# Patient Record
Sex: FEMALE | Race: WHITE | NOT HISPANIC OR LATINO | Employment: OTHER | URBAN - METROPOLITAN AREA
[De-identification: names, ages, dates, MRNs, and addresses within clinical notes are randomized per-mention and may not be internally consistent; named-entity substitution may affect disease eponyms.]

---

## 2017-04-05 ENCOUNTER — TRANSCRIBE ORDERS (OUTPATIENT)
Dept: ADMINISTRATIVE | Facility: HOSPITAL | Age: 77
End: 2017-04-05

## 2017-04-05 DIAGNOSIS — Z12.31 VISIT FOR SCREENING MAMMOGRAM: Primary | ICD-10-CM

## 2017-12-06 ENCOUNTER — APPOINTMENT (OUTPATIENT)
Dept: MAMMOGRAPHY | Facility: HOSPITAL | Age: 77
End: 2017-12-06
Attending: FAMILY MEDICINE

## 2021-06-14 DIAGNOSIS — M25.562 LEFT KNEE PAIN, UNSPECIFIED CHRONICITY: Primary | ICD-10-CM

## 2021-06-17 ENCOUNTER — OFFICE VISIT (OUTPATIENT)
Dept: ORTHOPEDIC SURGERY | Facility: CLINIC | Age: 81
End: 2021-06-17

## 2021-06-17 ENCOUNTER — HOSPITAL ENCOUNTER (OUTPATIENT)
Dept: GENERAL RADIOLOGY | Facility: HOSPITAL | Age: 81
Discharge: HOME | End: 2021-06-17
Admitting: FAMILY MEDICINE

## 2021-06-17 VITALS
HEART RATE: 75 BPM | WEIGHT: 164 LBS | BODY MASS INDEX: 32.2 KG/M2 | HEIGHT: 60 IN | DIASTOLIC BLOOD PRESSURE: 79 MMHG | SYSTOLIC BLOOD PRESSURE: 162 MMHG

## 2021-06-17 DIAGNOSIS — M25.662 KNEE STIFF, LEFT: ICD-10-CM

## 2021-06-17 DIAGNOSIS — M25.562 LEFT KNEE PAIN, UNSPECIFIED CHRONICITY: ICD-10-CM

## 2021-06-17 DIAGNOSIS — G89.29 CHRONIC PAIN OF LEFT KNEE: Primary | ICD-10-CM

## 2021-06-17 DIAGNOSIS — M17.12 PRIMARY OSTEOARTHRITIS OF LEFT KNEE: ICD-10-CM

## 2021-06-17 DIAGNOSIS — M25.562 CHRONIC PAIN OF LEFT KNEE: Primary | ICD-10-CM

## 2021-06-17 DIAGNOSIS — M25.462 EFFUSION OF LEFT KNEE: ICD-10-CM

## 2021-06-17 PROCEDURE — 99204 OFFICE O/P NEW MOD 45 MIN: CPT | Performed by: FAMILY MEDICINE

## 2021-06-17 PROCEDURE — 20610 DRAIN/INJ JOINT/BURSA W/O US: CPT | Performed by: FAMILY MEDICINE

## 2021-06-17 PROCEDURE — 73562 X-RAY EXAM OF KNEE 3: CPT

## 2021-06-17 PROCEDURE — 73562 X-RAY EXAM OF KNEE 3: CPT | Performed by: RADIOLOGY

## 2021-06-17 RX ORDER — IBUPROFEN 600 MG/1
TABLET ORAL
COMMUNITY
Start: 2021-04-19 | End: 2021-06-17

## 2021-06-17 RX ORDER — BUSPIRONE HYDROCHLORIDE 7.5 MG/1
TABLET ORAL
COMMUNITY
Start: 2021-05-06

## 2021-06-17 RX ORDER — TRAMADOL HYDROCHLORIDE 50 MG/1
TABLET ORAL
COMMUNITY
Start: 2021-04-19

## 2021-06-17 RX ORDER — TIZANIDINE 2 MG/1
TABLET ORAL
COMMUNITY
Start: 2021-04-19

## 2021-06-17 RX ORDER — METOPROLOL TARTRATE 100 MG/1
TABLET ORAL
COMMUNITY
Start: 2021-04-19

## 2021-06-17 RX ORDER — NAPROXEN 500 MG/1
500 TABLET ORAL 2 TIMES DAILY
Qty: 60 TABLET | Refills: 0 | Status: SHIPPED | OUTPATIENT
Start: 2021-06-17 | End: 2021-07-12

## 2021-06-17 RX ADMIN — METHYLPREDNISOLONE ACETATE 80 MG: 80 INJECTION, SUSPENSION INTRA-ARTICULAR; INTRALESIONAL; INTRAMUSCULAR; SOFT TISSUE at 12:24

## 2021-06-17 RX ADMIN — BUPIVACAINE HYDROCHLORIDE 5 ML: 5 INJECTION, SOLUTION EPIDURAL; INTRACAUDAL at 12:24

## 2021-06-17 RX ADMIN — BUPIVACAINE HYDROCHLORIDE 1 ML: 5 INJECTION, SOLUTION EPIDURAL; INTRACAUDAL at 12:24

## 2021-06-17 NOTE — PROGRESS NOTES
New Patient Visit      Patient: Jing Shrestha  YOB: 1940  Date of Encounter: 06/17/2021  PCP: Al France MD  Referring Provider: Jason Chan MD     Subjective   Jing Shrestha is a 80 y.o. female who presents to the office today for evaluation of Pain, Edema, and Initial Evaluation of the Left Knee      Chief Complaint:   Chief Complaint   Patient presents with   • Left Knee - Pain, Edema, Initial Evaluation       HPI:   HPI  Patient presents with complaint of severe left medial knee pain.  Very tender sometimes hurts her just to have the pressure of her clothes.  Bothers her a lot at night.  Had a steroid injection in the past which did not give her a whole lot of relief.  Interested in further treatments especially viscosupplementation.  Accompanied by her daughters Casandra and Alicia.  States she had the right knee replaced in 2001  Active Problem List:  Patient Active Problem List   Diagnosis   • Chronic pain of left knee       Past Medical History:  Past Medical History:   Diagnosis Date   • Anemia    • Arthritis    • Back complaints    • Bronchitis    • Bunion    • H/O bladder infections    • Headache    • High blood pressure    • History of blood transfusion        Past Surgical History:  Past Surgical History:   Procedure Laterality Date   • BREAST CYST EXCISION     • KNEE SURGERY     • OOPHORECTOMY         Family History:  Family History   Problem Relation Age of Onset   • Breast cancer Sister 65   • Cancer Sister    • Arthritis Mother    • Asthma Mother    • Heart disease Father        Social History:  Social History     Socioeconomic History   • Marital status: Single     Spouse name: Not on file   • Number of children: Not on file   • Years of education: Not on file   • Highest education level: Not on file   Tobacco Use   • Smoking status: Never Smoker   • Smokeless tobacco: Never Used   Vaping Use   • Vaping Use: Never used   Substance and Sexual Activity   • Alcohol use: Never  "      Medications:  Current Outpatient Medications   Medication Sig Dispense Refill   • busPIRone (BUSPAR) 7.5 MG tablet      • metoprolol tartrate (LOPRESSOR) 100 MG tablet      • tiZANidine (ZANAFLEX) 2 MG tablet      • traMADol (ULTRAM) 50 MG tablet      • Diclofenac Sodium (VOLTAREN) 1 % gel gel Apply 4 g topically to the appropriate area as directed 4 (Four) Times a Day As Needed (knee pain). Apply to medial left knee 150 g 1   • naproxen (NAPROSYN) 500 MG tablet Take 1 tablet by mouth 2 (Two) Times a Day. 60 tablet 0     No current facility-administered medications for this visit.       Allergies:  No Known Allergies    Review of Systems:   Review of Systems   Constitutional: Positive for activity change. Negative for fever.   Respiratory: Negative for shortness of breath.    Cardiovascular: Negative for chest pain.   Musculoskeletal: Positive for arthralgias, gait problem, joint swelling and myalgias.   Neurological: Negative for weakness and numbness.       Physical Exam:   Physical Exam  Vitals and nursing note reviewed.   Constitutional:       General: She is not in acute distress.     Appearance: Normal appearance.   Pulmonary:      Effort: Pulmonary effort is normal. No respiratory distress.   Musculoskeletal:      Left knee: Effusion and crepitus present. Decreased range of motion. Tenderness present over the medial joint line. No LCL laxity or MCL laxity.Normal pulse.      Comments: Painful on testing of the MCL.   Skin:     General: Skin is warm and dry.      Findings: No erythema.   Neurological:      General: No focal deficit present.      Mental Status: She is alert.      Sensory: No sensory deficit.      Motor: No weakness.       GENERAL: 80 y.o. female, alert and oriented X 3 in no acute distress.   Visit Vitals  /79   Pulse 75   Ht 152.4 cm (60\")   Wt 74.4 kg (164 lb)   BMI 32.03 kg/m²       Radiology Results:    XR Knee 3 View Left    Result Date: 6/17/2021  Moderate osteoarthritis in the " left knee.           Large Joint Arthrocentesis: L knee  Date/Time: 6/17/2021 12:24 PM  Consent given by: patient  Site marked: site marked  Timeout: Immediately prior to procedure a time out was called to verify the correct patient, procedure, equipment, support staff and site/side marked as required   Supporting Documentation  Indications: pain and joint swelling   Procedure Details  Location: knee - L knee  Preparation: Patient was prepped in usual sterile fashion.  Needle size: 22 G  Approach: anterolateral (infrapatellar)  Medications administered: 80 mg methylPREDNISolone acetate 80 MG/ML; 5 mL bupivacaine (PF) 0.5 %; 1 mL bupivacaine (PF) 0.5 %  Patient tolerance: patient tolerated the procedure well with no immediate complications      Injection site on lateral knee was cleaned with alcohol and iodine and anesthesia provided with ethyl chloride.  Then using 22-gauge 1.5 inch needle the knee joint was accessed and a mixture of 6 cc of 0.5% bupivicaine and 80 mg of methylprednisolone were injected.  Area was covered with sterile bandage.  Follow-up care precautions were discussed.  There were no adverse effects and negligible blood loss.        Assessment & Plan:   Assessment/Plan   Diagnoses and all orders for this visit:    1. Chronic pain of left knee (Primary)  -     naproxen (NAPROSYN) 500 MG tablet; Take 1 tablet by mouth 2 (Two) Times a Day.  Dispense: 60 tablet; Refill: 0  -     Diclofenac Sodium (VOLTAREN) 1 % gel gel; Apply 4 g topically to the appropriate area as directed 4 (Four) Times a Day As Needed (knee pain). Apply to medial left knee  Dispense: 150 g; Refill: 1  -     Ambulatory Referral to Physical Therapy Evaluate and treat  -     CBC & Differential  -     Comprehensive Metabolic Panel  -     Uric Acid  -     Sedimentation Rate  -     C-reactive Protein    2. Primary osteoarthritis of left knee  -     naproxen (NAPROSYN) 500 MG tablet; Take 1 tablet by mouth 2 (Two) Times a Day.  Dispense:  60 tablet; Refill: 0  -     Diclofenac Sodium (VOLTAREN) 1 % gel gel; Apply 4 g topically to the appropriate area as directed 4 (Four) Times a Day As Needed (knee pain). Apply to medial left knee  Dispense: 150 g; Refill: 1  -     Ambulatory Referral to Physical Therapy Evaluate and treat  -     CBC & Differential  -     Comprehensive Metabolic Panel  -     Uric Acid  -     Sedimentation Rate  -     C-reactive Protein    3. Effusion of left knee  -     naproxen (NAPROSYN) 500 MG tablet; Take 1 tablet by mouth 2 (Two) Times a Day.  Dispense: 60 tablet; Refill: 0  -     Diclofenac Sodium (VOLTAREN) 1 % gel gel; Apply 4 g topically to the appropriate area as directed 4 (Four) Times a Day As Needed (knee pain). Apply to medial left knee  Dispense: 150 g; Refill: 1  -     Ambulatory Referral to Physical Therapy Evaluate and treat  -     CBC & Differential  -     Comprehensive Metabolic Panel  -     Uric Acid  -     Sedimentation Rate  -     C-reactive Protein    4. Knee stiff, left  -     naproxen (NAPROSYN) 500 MG tablet; Take 1 tablet by mouth 2 (Two) Times a Day.  Dispense: 60 tablet; Refill: 0  -     Diclofenac Sodium (VOLTAREN) 1 % gel gel; Apply 4 g topically to the appropriate area as directed 4 (Four) Times a Day As Needed (knee pain). Apply to medial left knee  Dispense: 150 g; Refill: 1  -     Ambulatory Referral to Physical Therapy Evaluate and treat  -     CBC & Differential  -     Comprehensive Metabolic Panel  -     Uric Acid  -     Sedimentation Rate  -     C-reactive Protein    Other orders  -     Large Joint Arthrocentesis        MEDICATION ISSUES:  Discussed medication options and treatment plan of prescribed medication as well as the risks, benefits, and side effects including potential falls, possible impaired driving and metabolic adversities among others. Patient is agreeable to call the office with any worsening of symptoms or onset of side effects. Patient is agreeable to call 911 or go to the  nearest ER should he/she begin having SI/HI.     MEDS ORDERED DURING VISIT:  New Medications Ordered This Visit   Medications   • naproxen (NAPROSYN) 500 MG tablet     Sig: Take 1 tablet by mouth 2 (Two) Times a Day.     Dispense:  60 tablet     Refill:  0   • Diclofenac Sodium (VOLTAREN) 1 % gel gel     Sig: Apply 4 g topically to the appropriate area as directed 4 (Four) Times a Day As Needed (knee pain). Apply to medial left knee     Dispense:  150 g     Refill:  1   f/u in 2 weeks. Plan to do a viscosupplementation injection if we can get it approved. discussed that patient will end up needing knee replacement surgery eventually. May want to think about doing it sooner due to her age. She should go ahead and start that discussion with her PCP and cardiologist.             This document has been electronically signed by Trey Talley DO   June 17, 2021 12:29 EDT    Part of this note may be an electronic transcription/translation of spoken language to printed text using the Dragon Dictation System.

## 2021-06-22 RX ORDER — BUPIVACAINE HYDROCHLORIDE 5 MG/ML
1 INJECTION, SOLUTION EPIDURAL; INTRACAUDAL
Status: COMPLETED | OUTPATIENT
Start: 2021-06-17 | End: 2021-06-17

## 2021-06-22 RX ORDER — METHYLPREDNISOLONE ACETATE 80 MG/ML
80 INJECTION, SUSPENSION INTRA-ARTICULAR; INTRALESIONAL; INTRAMUSCULAR; SOFT TISSUE
Status: COMPLETED | OUTPATIENT
Start: 2021-06-17 | End: 2021-06-17

## 2021-06-22 RX ORDER — BUPIVACAINE HYDROCHLORIDE 5 MG/ML
5 INJECTION, SOLUTION EPIDURAL; INTRACAUDAL
Status: COMPLETED | OUTPATIENT
Start: 2021-06-17 | End: 2021-06-17

## 2021-07-09 DIAGNOSIS — M17.12 PRIMARY OSTEOARTHRITIS OF LEFT KNEE: ICD-10-CM

## 2021-07-09 DIAGNOSIS — M25.462 EFFUSION OF LEFT KNEE: ICD-10-CM

## 2021-07-09 DIAGNOSIS — M25.662 KNEE STIFF, LEFT: ICD-10-CM

## 2021-07-09 DIAGNOSIS — M25.562 CHRONIC PAIN OF LEFT KNEE: ICD-10-CM

## 2021-07-09 DIAGNOSIS — G89.29 CHRONIC PAIN OF LEFT KNEE: ICD-10-CM

## 2021-07-12 RX ORDER — NAPROXEN 500 MG/1
500 TABLET ORAL EVERY 12 HOURS PRN
Qty: 60 TABLET | Refills: 1 | Status: SHIPPED | OUTPATIENT
Start: 2021-07-12 | End: 2021-07-29 | Stop reason: SINTOL

## 2021-07-26 ENCOUNTER — TELEPHONE (OUTPATIENT)
Dept: ORTHOPEDIC SURGERY | Facility: CLINIC | Age: 81
End: 2021-07-26

## 2021-07-26 NOTE — TELEPHONE ENCOUNTER
Caller: OTILIA SMILEY    Relationship to patient: SELF    Best call back number: [phone]    Type of visit: F/U LEFT KNEE    Additional notes: PATIENT WOULD LIKE A CALL BACK TO DISCUSS GETTING GEL INJECTION ON 7-29-21 APPT.  SEEN LAST ON 6-17-21

## 2021-07-29 ENCOUNTER — OFFICE VISIT (OUTPATIENT)
Dept: ORTHOPEDIC SURGERY | Facility: CLINIC | Age: 81
End: 2021-07-29

## 2021-07-29 ENCOUNTER — HOSPITAL ENCOUNTER (OUTPATIENT)
Dept: GENERAL RADIOLOGY | Facility: HOSPITAL | Age: 81
Discharge: HOME | End: 2021-07-29
Admitting: FAMILY MEDICINE

## 2021-07-29 VITALS
HEIGHT: 60 IN | TEMPERATURE: 98.6 F | HEART RATE: 62 BPM | BODY MASS INDEX: 32.2 KG/M2 | WEIGHT: 164 LBS | DIASTOLIC BLOOD PRESSURE: 75 MMHG | SYSTOLIC BLOOD PRESSURE: 174 MMHG

## 2021-07-29 DIAGNOSIS — M25.662 KNEE STIFF, LEFT: ICD-10-CM

## 2021-07-29 DIAGNOSIS — M17.12 PRIMARY OSTEOARTHRITIS OF LEFT KNEE: ICD-10-CM

## 2021-07-29 DIAGNOSIS — G89.29 CHRONIC BILATERAL LOW BACK PAIN WITH BILATERAL SCIATICA: Primary | ICD-10-CM

## 2021-07-29 DIAGNOSIS — M54.41 CHRONIC BILATERAL LOW BACK PAIN WITH BILATERAL SCIATICA: Primary | ICD-10-CM

## 2021-07-29 DIAGNOSIS — M51.36 DDD (DEGENERATIVE DISC DISEASE), LUMBAR: ICD-10-CM

## 2021-07-29 DIAGNOSIS — M25.562 CHRONIC PAIN OF LEFT KNEE: ICD-10-CM

## 2021-07-29 DIAGNOSIS — G89.29 CHRONIC PAIN OF LEFT KNEE: ICD-10-CM

## 2021-07-29 DIAGNOSIS — M54.42 CHRONIC BILATERAL LOW BACK PAIN WITH BILATERAL SCIATICA: Primary | ICD-10-CM

## 2021-07-29 PROCEDURE — 72170 X-RAY EXAM OF PELVIS: CPT | Performed by: RADIOLOGY

## 2021-07-29 PROCEDURE — 20610 DRAIN/INJ JOINT/BURSA W/O US: CPT | Performed by: FAMILY MEDICINE

## 2021-07-29 PROCEDURE — 72110 X-RAY EXAM L-2 SPINE 4/>VWS: CPT

## 2021-07-29 PROCEDURE — 72170 X-RAY EXAM OF PELVIS: CPT

## 2021-07-29 PROCEDURE — 72110 X-RAY EXAM L-2 SPINE 4/>VWS: CPT | Performed by: RADIOLOGY

## 2021-07-29 PROCEDURE — 99214 OFFICE O/P EST MOD 30 MIN: CPT | Performed by: FAMILY MEDICINE

## 2021-07-29 RX ORDER — LIDOCAINE HYDROCHLORIDE 10 MG/ML
5 INJECTION, SOLUTION EPIDURAL; INFILTRATION; INTRACAUDAL; PERINEURAL
Status: COMPLETED | OUTPATIENT
Start: 2021-07-29 | End: 2021-07-29

## 2021-07-29 RX ADMIN — LIDOCAINE HYDROCHLORIDE 5 ML: 10 INJECTION, SOLUTION EPIDURAL; INFILTRATION; INTRACAUDAL; PERINEURAL at 16:11

## 2021-07-29 NOTE — PROGRESS NOTES
"Follow Up Visit      Patient: Jing Shrestha  YOB: 1940  Date of Encounter: 07/29/2021  PCP: Al France MD    Subjective   Jing Shrestha is a 80 y.o. female who presents to the office today as a follow up for Follow-up and Pain of the Left Knee      Chief Complaint:   Chief Complaint   Patient presents with   • Left Knee - Follow-up, Pain       HPI:   HPI  Patient presents for follow-up for chronic left knee pain.  Had a steroid injection to last visit.  Reports that she only got about 1 week of relief from it and it is now completely worn off.  Knee pain is back to where it was.  Would like to try the Monovisc injection we discussed previously.  Patient also complains of chronic low back pain issues which have been bothering her for years.  Patient has been told she has degenerative changes and \"collapsed disks\".  I do not have her old MRI but patient was getting spine injections as recently as 4 years ago through a doctor in [place].  Patient would like to restart these injections preferably more locally.  Low back pain has been getting steadily worse and sometimes radiates down the legs.  Patient states that she wakes up at 3 in the morning every day with back pain and has to go soak in the tub in hot water to relieve it.    Patient reports that she could not take the naproxen because it gave her heartburn but the Voltaren gel is helping.  She stopped the naproxen and switched back to the ibuprofen but it is not really working.  Patient has a muscle relaxer but does not take it consistently.  Allergies:  No Known Allergies    Review of Systems:   Review of Systems   Constitutional: Positive for activity change. Negative for fever.   Respiratory: Negative for shortness of breath.    Cardiovascular: Negative for chest pain.   Musculoskeletal: Positive for arthralgias, back pain, gait problem, joint swelling, myalgias and neck pain.   Neurological: Negative for weakness and numbness. " "      Visit Vitals  /75   Pulse 62   Temp 98.6 °F (37 °C)   Ht 152.4 cm (60\")   Wt 74.4 kg (164 lb)   BMI 32.03 kg/m²       Physical Exam:   Physical Exam  Vitals and nursing note reviewed.   Constitutional:       General: She is not in acute distress.     Appearance: Normal appearance.   Pulmonary:      Effort: Pulmonary effort is normal. No respiratory distress.   Musculoskeletal:      Lumbar back: Spasms, tenderness and bony tenderness present. Decreased range of motion. Positive left straight leg raise test.      Left knee: Crepitus present. No effusion. Decreased range of motion. Tenderness present over the medial joint line. No LCL laxity or MCL laxity.Normal pulse.      Comments: Borderline positive left seated straight leg raise test.  Tender over lower lumbar spine paraspinals and SI joints     Skin:     General: Skin is warm and dry.      Findings: No erythema.   Neurological:      General: No focal deficit present.      Mental Status: She is alert.      Sensory: No sensory deficit.      Motor: No weakness.         Radiology Results:    No radiology results for the last 30 days.  X-ray lumbar and pelvis: Preliminary interpretation: Diffuse mild to moderate degenerative changes in lumbar spine and SI joints.  No acute process    Large Joint Arthrocentesis: L knee  Date/Time: 7/29/2021 4:11 PM  Consent given by: patient  Site marked: site marked  Timeout: Immediately prior to procedure a time out was called to verify the correct patient, procedure, equipment, support staff and site/side marked as required   Supporting Documentation  Indications: pain and joint swelling   Procedure Details  Location: knee - L knee  Preparation: Patient was prepped in usual sterile fashion.  Needle size: 20 G  Approach: anterolateral (infrapatellar)  Medications administered: 5 mL lidocaine PF 1% 1 %; 88 mg Hyaluronan 88 MG/4ML  Patient tolerance: patient tolerated the procedure well with no immediate " complications        Injection site on the lateral knee was cleaned with alcohol and iodine and anesthesia was provided with ethyl chloride spray.  Then 3 cc of 1% lidocaine without epinephrine was injected into the subcutaneous tissues using a 1.5 inch 25-gauge needle for anesthesia.  Then using sterile technique and a 20-gauge 1.5 inch needle the knee joint was accessed and further 2 cc of 1% lidocaine was injected to confirm access to the joint.  Using sterile technique the syringe was switched and 88 mg of Monovisc was injected.  The injection site was covered with sterile bandage.  There is no adverse effects and negligible  blood loss.  Follow-up care and precautions were discussed.    Assessment & Plan:   Assessment/Plan   Diagnoses and all orders for this visit:    1. 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  Dispense: 90 tablet; Refill: 1  -     MRI Lumbar Spine Without Contrast; Future    2. Chronic pain of left knee  -     XR Spine Lumbar 4+ View  -     XR Pelvis 1 or 2 View  -     diclofenac (VOLTAREN) 50 MG EC tablet; Take 1 tablet by mouth Every 8 (Eight) Hours As Needed (pain).  Dispense: 90 tablet; Refill: 1    3. Primary osteoarthritis of left knee  -     XR Spine Lumbar 4+ View  -     XR Pelvis 1 or 2 View  -     diclofenac (VOLTAREN) 50 MG EC tablet; Take 1 tablet by mouth Every 8 (Eight) Hours As Needed (pain).  Dispense: 90 tablet; Refill: 1    4. Knee stiff, left  -     XR Spine Lumbar 4+ View  -     XR Pelvis 1 or 2 View  -     diclofenac (VOLTAREN) 50 MG EC tablet; Take 1 tablet by mouth Every 8 (Eight) Hours As Needed (pain).  Dispense: 90 tablet; Refill: 1    5. DDD (degenerative disc disease), lumbar  -     MRI Lumbar Spine Without Contrast; Future    Other orders  -     Large Joint Arthrocentesis:  L knee        Visit Diagnoses:    ICD-10-CM ICD-9-CM   1. Chronic bilateral low back pain with bilateral sciatica  M54.42 724.2    M54.41 724.3    G89.29 338.29   2. Chronic pain of left knee  M25.562 719.46    G89.29 338.29   3. Primary osteoarthritis of left knee  M17.12 715.16   4. Knee stiff, left  M25.662 719.56   5. DDD (degenerative disc disease), lumbar  M51.36 722.52       MEDICATION ISSUES:  Discussed medication options and treatment plan of prescribed medication as well as the risks, benefits, and side effects including potential falls, possible impaired driving and metabolic adversities among others. Patient is agreeable to call the office with any worsening of symptoms or onset of side effects. Patient is agreeable to call 911 or go to the nearest ER should he/she begin having SI/HI.     MEDS ORDERED DURING VISIT:  New Medications Ordered This Visit   Medications   • diclofenac (VOLTAREN) 50 MG EC tablet     Sig: Take 1 tablet by mouth Every 8 (Eight) Hours As Needed (pain).     Dispense:  90 tablet     Refill:  1     Change NSAID to enteric-coated diclofenac.  Recommend patient try taking tizanidine nightly instead of just as needed.  Gave patient home exercises for low back and SI.  She does not want to do real PT right now as transportation is a problem in her insurance will only pay for limited trips from [place].  Patient did not get a satisfactory response to intra-articular steroid injection in the left knee.  If this is repeated in 3 months would likely use Zilretta instead of short acting.  Monovisc injection today in the office.  Patient did get some initial relief from the local anesthetic.  Patient will follow up in a month for recheck and to go over MRI results and to decide if she wants to pursue further spine injections.  Patient will call us back in about a week to let us know if she had any problems with the injection.         This document has been electronically signed by Trey  DO Gerri   July 29, 2021 16:48 EDT    Part of this note may be an electronic transcription/translation of spoken language to printed text using the Dragon Dictation System.

## 2021-09-03 ENCOUNTER — TELEPHONE (OUTPATIENT)
Dept: ORTHOPEDIC SURGERY | Facility: CLINIC | Age: 81
End: 2021-09-03

## 2021-09-03 NOTE — TELEPHONE ENCOUNTER
Spoke with patient intending to cancel her appt because she doesn't have her MRI completed but she wants a steroid inj. I am waiting to hear back from Dr. Talley to see if she can get one. She also informed me that she needs open MRI.

## 2021-09-08 ENCOUNTER — TELEPHONE (OUTPATIENT)
Dept: ORTHOPEDIC SURGERY | Facility: CLINIC | Age: 81
End: 2021-09-08

## 2021-09-08 NOTE — TELEPHONE ENCOUNTER
Patient called, I explained the situation with the MRI. She wants to speak with Dr. Talley about it so I am leaving her on the schedule for tomorrow. She said she is trying to line up a ride with a taxi service through her insurance to bring her. Her daughter brought her last time but she lives in [place]. She is going to call me back to let me know if she will have a ride.

## 2021-09-09 ENCOUNTER — OFFICE VISIT (OUTPATIENT)
Dept: ORTHOPEDIC SURGERY | Facility: CLINIC | Age: 81
End: 2021-09-09

## 2021-09-09 VITALS
HEART RATE: 65 BPM | DIASTOLIC BLOOD PRESSURE: 95 MMHG | WEIGHT: 164 LBS | HEIGHT: 60 IN | BODY MASS INDEX: 32.2 KG/M2 | SYSTOLIC BLOOD PRESSURE: 156 MMHG | TEMPERATURE: 97.3 F

## 2021-09-09 DIAGNOSIS — M17.12 PRIMARY OSTEOARTHRITIS OF LEFT KNEE: ICD-10-CM

## 2021-09-09 DIAGNOSIS — G89.29 CHRONIC PAIN OF LEFT KNEE: Primary | ICD-10-CM

## 2021-09-09 DIAGNOSIS — M51.36 DDD (DEGENERATIVE DISC DISEASE), LUMBAR: ICD-10-CM

## 2021-09-09 DIAGNOSIS — G89.29 CHRONIC BILATERAL LOW BACK PAIN WITH BILATERAL SCIATICA: ICD-10-CM

## 2021-09-09 DIAGNOSIS — M25.662 KNEE STIFF, LEFT: ICD-10-CM

## 2021-09-09 DIAGNOSIS — M54.41 CHRONIC BILATERAL LOW BACK PAIN WITH BILATERAL SCIATICA: ICD-10-CM

## 2021-09-09 DIAGNOSIS — M25.562 CHRONIC PAIN OF LEFT KNEE: Primary | ICD-10-CM

## 2021-09-09 DIAGNOSIS — M25.462 EFFUSION OF LEFT KNEE: ICD-10-CM

## 2021-09-09 DIAGNOSIS — M54.42 CHRONIC BILATERAL LOW BACK PAIN WITH BILATERAL SCIATICA: ICD-10-CM

## 2021-09-09 PROCEDURE — 99214 OFFICE O/P EST MOD 30 MIN: CPT | Performed by: FAMILY MEDICINE

## 2021-09-09 PROCEDURE — 96372 THER/PROPH/DIAG INJ SC/IM: CPT | Performed by: FAMILY MEDICINE

## 2021-09-09 RX ORDER — KETOROLAC TROMETHAMINE 30 MG/ML
60 INJECTION, SOLUTION INTRAMUSCULAR; INTRAVENOUS ONCE
Status: COMPLETED | OUTPATIENT
Start: 2021-09-09 | End: 2021-09-09

## 2021-09-09 RX ORDER — HYDROCODONE BITARTRATE AND ACETAMINOPHEN 7.5; 325 MG/1; MG/1
TABLET ORAL SEE ADMIN INSTRUCTIONS
COMMUNITY
Start: 2021-08-31

## 2021-09-09 RX ORDER — MELOXICAM 7.5 MG/1
7.5 TABLET ORAL DAILY
Qty: 30 TABLET | Refills: 1 | Status: SHIPPED | OUTPATIENT
Start: 2021-09-09 | End: 2021-09-30 | Stop reason: SDUPTHER

## 2021-09-09 RX ADMIN — KETOROLAC TROMETHAMINE 60 MG: 30 INJECTION, SOLUTION INTRAMUSCULAR; INTRAVENOUS at 09:38

## 2021-09-09 NOTE — PROGRESS NOTES
"Follow Up Visit      Patient: Jing Shrestha  YOB: 1940  Date of Encounter: 09/09/2021  PCP: Al France MD    Subjective   Jing Shrestha is a 80 y.o. female who presents to the office today as a follow up for Follow-up and Pain of the Left Knee      Chief Complaint:   Chief Complaint   Patient presents with   • Left Knee - Follow-up, Pain       HPI:   HPI  Patient presents for follow-up for left knee pain.  Reports only mild improvement after Monovisc injection at the last visit.  Continues to be fairly sore on the medial knee.  Interested in further treatment.  Does not feel her NSAID is helping much and would like to change it.  Allergies:  No Known Allergies    Review of Systems:   Review of Systems   Constitutional: Positive for activity change. Negative for fever.   Respiratory: Negative for shortness of breath.    Cardiovascular: Negative for chest pain.   Musculoskeletal: Positive for arthralgias, back pain, gait problem, joint swelling, myalgias and neck pain.   Neurological: Negative for weakness and numbness.       Visit Vitals  /95   Pulse 65   Temp 97.3 °F (36.3 °C)   Ht 152.4 cm (60\")   Wt 74.4 kg (164 lb)   BMI 32.03 kg/m²       Physical Exam:   Physical Exam  Vitals and nursing note reviewed.   Constitutional:       General: She is not in acute distress.     Appearance: Normal appearance.   Pulmonary:      Effort: Pulmonary effort is normal. No respiratory distress.   Musculoskeletal:      Lumbar back: Spasms, tenderness and bony tenderness present. Decreased range of motion. Positive left straight leg raise test.      Left knee: Crepitus present. No effusion. Decreased range of motion. Tenderness present over the medial joint line. No LCL laxity or MCL laxity.Normal pulse.      Comments: Improved pain intensity from previous in the left knee   Skin:     General: Skin is warm and dry.      Findings: No erythema.   Neurological:      General: No focal deficit present.      " Mental Status: She is alert.      Sensory: No sensory deficit.      Motor: No weakness.     Assessment & Plan:   Assessment/Plan   Diagnoses and all orders for this visit:    1. Chronic pain of left knee (Primary)  -     meloxicam (MOBIC) 7.5 MG tablet; Take 1 tablet by mouth Daily.  Dispense: 30 tablet; Refill: 1  -      Custome Knee Orthosis  -     ketorolac (TORADOL) injection 60 mg    2. Primary osteoarthritis of left knee  -     meloxicam (MOBIC) 7.5 MG tablet; Take 1 tablet by mouth Daily.  Dispense: 30 tablet; Refill: 1  -      Custome Knee Orthosis  -     ketorolac (TORADOL) injection 60 mg    3. Knee stiff, left  -      Custome Knee Orthosis  -     ketorolac (TORADOL) injection 60 mg    4. Effusion of left knee  -      Custome Knee Orthosis  -     ketorolac (TORADOL) injection 60 mg    5. Chronic bilateral low back pain with bilateral sciatica  -     meloxicam (MOBIC) 7.5 MG tablet; Take 1 tablet by mouth Daily.  Dispense: 30 tablet; Refill: 1  -     ketorolac (TORADOL) injection 60 mg    6. DDD (degenerative disc disease), lumbar  -     ketorolac (TORADOL) injection 60 mg        Visit Diagnoses:    ICD-10-CM ICD-9-CM   1. Chronic pain of left knee  M25.562 719.46    G89.29 338.29   2. Primary osteoarthritis of left knee  M17.12 715.16   3. Knee stiff, left  M25.662 719.56   4. Effusion of left knee  M25.462 719.06   5. Chronic bilateral low back pain with bilateral sciatica  M54.42 724.2    M54.41 724.3    G89.29 338.29   6. DDD (degenerative disc disease), lumbar  M51.36 722.52       MEDICATION ISSUES:  Discussed medication options and treatment plan of prescribed medication as well as the risks, benefits, and side effects including potential falls, possible impaired driving and metabolic adversities among others. Patient is agreeable to call the office with any worsening of symptoms or onset of side effects. Patient is agreeable to call 911 or go to the nearest ER should he/she begin  having SI/HI.     MEDS ORDERED DURING VISIT:  New Medications Ordered This Visit   Medications   • meloxicam (MOBIC) 7.5 MG tablet     Sig: Take 1 tablet by mouth Daily.     Dispense:  30 tablet     Refill:  1   • ketorolac (TORADOL) injection 60 mg     Patient desires further injections.  We will get her precertified for Zilretta and she will follow-up for the injection.  We have fitted her with a deloader brace today in the office which she reports significant relief while wearing.  Stopped patient's diclofenac and will try Mobic instead.  Patient will let me know if she has any GI side effects.  Toradol given for pain relief in the office.  We will delay starting her Mobic for 12 hours..       This document has been electronically signed by Trey Talley DO   September 9, 2021 17:40 EDT    Part of this note may be an electronic transcription/translation of spoken language to printed text using the Dragon Dictation System.

## 2021-09-20 DIAGNOSIS — M25.562 CHRONIC PAIN OF LEFT KNEE: ICD-10-CM

## 2021-09-20 DIAGNOSIS — M25.662 KNEE STIFF, LEFT: ICD-10-CM

## 2021-09-20 DIAGNOSIS — G89.29 CHRONIC BILATERAL LOW BACK PAIN WITH BILATERAL SCIATICA: ICD-10-CM

## 2021-09-20 DIAGNOSIS — M17.12 PRIMARY OSTEOARTHRITIS OF LEFT KNEE: ICD-10-CM

## 2021-09-20 DIAGNOSIS — M54.42 CHRONIC BILATERAL LOW BACK PAIN WITH BILATERAL SCIATICA: ICD-10-CM

## 2021-09-20 DIAGNOSIS — G89.29 CHRONIC PAIN OF LEFT KNEE: ICD-10-CM

## 2021-09-20 DIAGNOSIS — M54.41 CHRONIC BILATERAL LOW BACK PAIN WITH BILATERAL SCIATICA: ICD-10-CM

## 2021-09-21 ENCOUNTER — TELEPHONE (OUTPATIENT)
Dept: ORTHOPEDIC SURGERY | Facility: CLINIC | Age: 81
End: 2021-09-21

## 2021-09-21 NOTE — TELEPHONE ENCOUNTER
Called patient to give her an appt to come in for [place]ong. She is scheduled for this coming Thursday at 8:45. She is contacting her daughter for a ride if there is any problem she will call and we will re-schedule. Patient stated that her knee has been killing her and it's hot on the right side. She hasn't been running a fever.

## 2021-09-23 ENCOUNTER — TELEPHONE (OUTPATIENT)
Dept: ORTHOPEDIC SURGERY | Facility: CLINIC | Age: 81
End: 2021-09-23

## 2021-09-23 NOTE — TELEPHONE ENCOUNTER
Patient called someone jerman Canales called from our number. Not sure what the call was about and who called her. I asked about her knee and she said it's killing her, can barely walk. She stated she rescheduled her appt from to today to the 30th. Stated she will probably have to have her knee rebuilt but didn't know if insurance would cover that. She is scheduled to get the Zilretta inj at upcoming appt with Dr. Talley.

## 2021-09-30 ENCOUNTER — OFFICE VISIT (OUTPATIENT)
Dept: ORTHOPEDIC SURGERY | Facility: CLINIC | Age: 81
End: 2021-09-30

## 2021-09-30 VITALS
HEART RATE: 65 BPM | DIASTOLIC BLOOD PRESSURE: 82 MMHG | SYSTOLIC BLOOD PRESSURE: 198 MMHG | BODY MASS INDEX: 32.2 KG/M2 | WEIGHT: 164 LBS | HEIGHT: 60 IN

## 2021-09-30 DIAGNOSIS — M25.562 CHRONIC PAIN OF LEFT KNEE: ICD-10-CM

## 2021-09-30 DIAGNOSIS — G89.29 CHRONIC PAIN OF LEFT KNEE: ICD-10-CM

## 2021-09-30 DIAGNOSIS — G89.29 CHRONIC BILATERAL LOW BACK PAIN WITH BILATERAL SCIATICA: ICD-10-CM

## 2021-09-30 DIAGNOSIS — M17.12 PRIMARY OSTEOARTHRITIS OF LEFT KNEE: ICD-10-CM

## 2021-09-30 DIAGNOSIS — M54.41 CHRONIC BILATERAL LOW BACK PAIN WITH BILATERAL SCIATICA: ICD-10-CM

## 2021-09-30 DIAGNOSIS — M54.42 CHRONIC BILATERAL LOW BACK PAIN WITH BILATERAL SCIATICA: ICD-10-CM

## 2021-09-30 PROCEDURE — 20610 DRAIN/INJ JOINT/BURSA W/O US: CPT | Performed by: FAMILY MEDICINE

## 2021-09-30 PROCEDURE — 99214 OFFICE O/P EST MOD 30 MIN: CPT | Performed by: FAMILY MEDICINE

## 2021-09-30 RX ORDER — MELOXICAM 15 MG/1
15 TABLET ORAL DAILY
Qty: 30 TABLET | Refills: 1 | Status: SHIPPED | OUTPATIENT
Start: 2021-09-30

## 2021-09-30 RX ORDER — BUPIVACAINE HYDROCHLORIDE 5 MG/ML
5 INJECTION, SOLUTION EPIDURAL; INTRACAUDAL
Status: COMPLETED | OUTPATIENT
Start: 2021-09-30 | End: 2021-09-30

## 2021-09-30 RX ADMIN — BUPIVACAINE HYDROCHLORIDE 5 ML: 5 INJECTION, SOLUTION EPIDURAL; INTRACAUDAL at 11:35

## 2021-09-30 NOTE — PROGRESS NOTES
"Follow Up Visit      Patient: Jing Shrestha  YOB: 1940  Date of Encounter: 09/30/2021  PCP: Al France MD  Referring Provider: No ref. provider found     Subjective   Jing Shrestha is a 80 y.o. female who presents to the office today for evaluation of Pain and Follow-up of the Left Knee      Chief Complaint   Patient presents with   • Left Knee - Pain, Follow-up       HPI  Patient presents for follow-up for left knee pain.  Reports that she did not think the Mobic was working quite as well as her Aleve and she switched back to it.  Was only on the 7.5 mg dose of Mobic.  Reports that her medial left knee is very painful with significant burning sensation that is nearly constant.  Patient has been wearing her  knee brace and feels that it is helping her get around better.  Does want to get a Zilretta injection today if possible.  They have still not scheduled her lumbar spine MRI due to transportation and insurance coverage issues.  Patient Active Problem List   Diagnosis   • Chronic pain of left knee       Past Medical History:   Diagnosis Date   • Anemia    • Arthritis    • Back complaints    • Bronchitis    • Bunion    • H/O bladder infections    • Headache    • High blood pressure    • History of blood transfusion        No Known Allergies    Review of Systems   Constitutional: Positive for activity change. Negative for fever.   Respiratory: Negative for shortness of breath.    Cardiovascular: Negative for chest pain.   Musculoskeletal: Positive for arthralgias, back pain, gait problem, joint swelling, myalgias and neck pain.   Neurological: Negative for weakness and numbness.       Visit Vitals  BP (!) 198/82   Pulse 65   Ht 152.4 cm (60\")   Wt 74.4 kg (164 lb)   BMI 32.03 kg/m²     80 y.o.female  Physical Exam  Vitals and nursing note reviewed.   Constitutional:       General: She is not in acute distress.     Appearance: Normal appearance.   Pulmonary:      Effort: Pulmonary effort " is normal. No respiratory distress.   Musculoskeletal:      Lumbar back: Spasms, tenderness and bony tenderness present. Decreased range of motion. Positive left straight leg raise test.      Left knee: Crepitus present. No effusion. Decreased range of motion. Tenderness present over the medial joint line. No LCL laxity or MCL laxity.Normal pulse.      Instability Tests: Medial Deepali test positive.      Comments: Knee tenderness much worse than previous   Skin:     General: Skin is warm and dry.      Findings: No erythema.   Neurological:      General: No focal deficit present.      Mental Status: She is alert.      Sensory: No sensory deficit.      Motor: No weakness.         Radiology Results:    Reviewed imaging: X-ray left knee 6/17/2021  The bones of the knee show fairly advanced changes of osteoarthritis  with complete medial joint space loss, sclerosis and bony remodeling.  There are also arthritic changes in the patellofemoral joint space.     IMPRESSION:  Moderate osteoarthritis in the left knee.     Large Joint Arthrocentesis: L knee  Date/Time: 9/30/2021 11:35 AM  Consent given by: patient  Site marked: site marked  Timeout: Immediately prior to procedure a time out was called to verify the correct patient, procedure, equipment, support staff and site/side marked as required   Supporting Documentation  Indications: pain and joint swelling   Procedure Details  Location: knee - L knee  Preparation: Patient was prepped in usual sterile fashion.  Needle size: 20 G  Approach: anterolateral (infrapatellar)  Medications administered: 32 mg Triamcinolone Acetonide 32 MG; 5 mL bupivacaine (PF) 0.5 %  Patient tolerance: patient tolerated the procedure well with no immediate complications       Injection site on the lateral knee was cleaned with alcohol and iodine and anesthesia was provided with ethyl chloride spray.  Then 5 cc of 0.5% bupivacaine without epinephrine was injected into the subcutaneous tissues  using a 1.5 inch 25-gauge needle for anesthesia.  Then using sterile technique and a 20-gauge 1.5 inch needle the knee joint was accessed 32mg of zilretta was injected.  The injection site was covered with sterile bandage.  There is no adverse effects and negligible  blood loss.  Follow-up care and precautions were discussed.      Assessment/Plan   Diagnoses and all orders for this visit:    1. Chronic pain of left knee  -     meloxicam (MOBIC) 15 MG tablet; Take 1 tablet by mouth Daily.  Dispense: 30 tablet; Refill: 1    2. Primary osteoarthritis of left knee  -     meloxicam (MOBIC) 15 MG tablet; Take 1 tablet by mouth Daily.  Dispense: 30 tablet; Refill: 1    3. Chronic bilateral low back pain with bilateral sciatica  -     meloxicam (MOBIC) 15 MG tablet; Take 1 tablet by mouth Daily.  Dispense: 30 tablet; Refill: 1         MEDS ORDERED DURING VISIT:  New Medications Ordered This Visit   Medications   • meloxicam (MOBIC) 15 MG tablet     Sig: Take 1 tablet by mouth Daily.     Dispense:  30 tablet     Refill:  1     MEDICATION ISSUES:  Discussed medication options and treatment plan of prescribed medication as well as the risks, benefits, and side effects including potential falls, possible impaired driving and metabolic adversities among others. Patient is agreeable to call the office with any worsening of symptoms or onset of side effects. Patient is agreeable to call 911 or go to the nearest ER should he/she begin having SI/HI.     Discussion:  Zilretta injection the left knee today in the office.  Patient has previously had short acting steroid and Monovisc injections without any significant lasting relief.  Hopefully this time release medicine will give her better relief.  Patient should switch back to Mobic at the higher dose of 15 mg.  Try it for a few weeks and let me know if it is not working.  We will continue trying to schedule her MRI.  Follow-up in 3 to 4 weeks           This document has been  electronically signed by Trey Talley DO   September 30, 2021 11:32 EDT    Part of this note may be an electronic transcription/translation of spoken language to printed text using the Dragon Dictation System.

## 2021-11-18 ENCOUNTER — TELEPHONE (OUTPATIENT)
Dept: ORTHOPEDIC SURGERY | Facility: CLINIC | Age: 81
End: 2021-11-18

## 2021-11-18 NOTE — TELEPHONE ENCOUNTER
Caller: OTILIA Lopez call back number: [phone]    What is the medical concern/diagnosis: BACK PAIN    What specialty or service is being requested: MRI FOR BACK    What is the provider, practice or medical service name: [facility]      What is the office phone number: [phone]

## 2021-11-19 ENCOUNTER — TELEPHONE (OUTPATIENT)
Dept: ORTHOPEDIC SURGERY | Facility: CLINIC | Age: 81
End: 2021-11-19

## 2021-11-19 NOTE — TELEPHONE ENCOUNTER
Returned patients call about her MRI. She wants it done at [facility] and is ok with it being closed MRI, she states she just needs to get it done. I told her I will get it set up for. I am waiting for a return call from the one that does the MRI scheduling.

## 2021-11-19 NOTE — TELEPHONE ENCOUNTER
Before she always wanted her MRI to be an open MRI. I called [facility] and asked if they had one and I was told no. I called to let the patient know and she said she knows but shes got to get it done so I asked her just to be if she is ok having a closed MRI and she said yes. I told her I will get it set up for her. I'm waiting for a call back from the scheduling dept to get it set up.
